# Patient Record
Sex: MALE | Race: AMERICAN INDIAN OR ALASKA NATIVE
[De-identification: names, ages, dates, MRNs, and addresses within clinical notes are randomized per-mention and may not be internally consistent; named-entity substitution may affect disease eponyms.]

---

## 2019-11-21 ENCOUNTER — HOSPITAL ENCOUNTER (EMERGENCY)
Dept: HOSPITAL 5 - ED | Age: 19
Discharge: HOME | End: 2019-11-21
Payer: COMMERCIAL

## 2019-11-21 VITALS — SYSTOLIC BLOOD PRESSURE: 129 MMHG | DIASTOLIC BLOOD PRESSURE: 49 MMHG

## 2019-11-21 DIAGNOSIS — Z79.899: ICD-10-CM

## 2019-11-21 DIAGNOSIS — F17.200: ICD-10-CM

## 2019-11-21 DIAGNOSIS — R07.89: Primary | ICD-10-CM

## 2019-11-21 PROCEDURE — 93010 ELECTROCARDIOGRAM REPORT: CPT

## 2019-11-21 PROCEDURE — 71046 X-RAY EXAM CHEST 2 VIEWS: CPT

## 2019-11-21 PROCEDURE — 93005 ELECTROCARDIOGRAM TRACING: CPT

## 2019-11-21 PROCEDURE — 99283 EMERGENCY DEPT VISIT LOW MDM: CPT

## 2019-11-21 NOTE — XRAY REPORT
CHEST 2 VIEWS 



INDICATION / CLINICAL INFORMATION:

Chest pain beginning this morning.



COMPARISON: 

None available.



FINDINGS:



SUPPORT DEVICES: None.

HEART / MEDIASTINUM: No significant abnormality. 

LUNGS / PLEURA: No significant pulmonary or pleural abnormality. No pneumothorax. 



ADDITIONAL FINDINGS: No significant additional findings.



IMPRESSION:

1. No acute abnormality of the chest.



Signer Name: Panda Alonzo MD 

Signed: 11/21/2019 2:40 PM

 Workstation Name: [id number]

## 2019-11-21 NOTE — EMERGENCY DEPARTMENT REPORT
ED General Adult HPI





- General


Chief complaint: Chest Pain


Stated complaint: SOB/HEART MURMUR


Time Seen by Provider: 19 15:53


Source: patient


Mode of arrival: Ambulatory


Limitations: No Limitations





- History of Present Illness


Initial comments: 





19-year-old -American male to emergency department complaining of central

chest pain that worsens with range of motion and deep breaths which is started 

when he woke up today.  States that he works for [employer] and has been doing a lot of

packing attending and development which preceded his pain episodes.  Reports no 

fever, chills, sweats no hemoptysis no hematemesis or hematochezia coryza no 

sputum production or wheezing.  He has no current treatment for any 

cardiopulmonary pathology.


-: Gradual


Radiation: non-radiation


Quality: stabbing, constant


Consistency: constant


Worsens with: none


Associated Symptoms: chest pain.  denies: cough, diaphoresis, loss of appetite, 

malaise, syncope, weakness


Treatments Prior to Arrival: none





- Related Data


                                  Previous Rx's











 Medication  Instructions  Recorded  Last Taken  Type


 


Ketorolac [Toradol] 10 mg PO Q6H PRN #20 tablet 19 Unknown Rx











                                    Allergies











Allergy/AdvReac Type Severity Reaction Status Date / Time


 


No Known Allergies Allergy   Unverified 19 13:31














ED Review of Systems


ROS: 


Stated complaint: SOB/HEART MURMUR


Other details as noted in HPI





Comment: All other systems reviewed and negative





ED Past Medical Hx





- Past Medical History


Previous Medical History?: Yes


Additional medical history: pulmonary stenosis





- Surgical History


Past Surgical History?: No





- Social History


Smoking Status: Current Every Day Smoker


Substance Use Type: None





- Medications


Home Medications: 


                                Home Medications











 Medication  Instructions  Recorded  Confirmed  Last Taken  Type


 


Ketorolac [Toradol] 10 mg PO Q6H PRN #20 tablet 19  Unknown Rx














ED Physical Exam





- General


Limitations: No Limitations


General appearance: alert, in no apparent distress





- Head


Head exam: Present: atraumatic, normocephalic





- Eye


Eye exam: Present: normal appearance, PERRL, EOMI.  Absent: scleral icterus, 

conjunctival injection, periorbital swelling, periorbital tenderness


Pupils: Present: normal accommodation





- ENT


ENT exam: Present: normal exam, normal orophraynx, mucous membranes moist, TM's 

normal bilaterally





- Neck


Neck exam: Present: normal inspection, full ROM





- Respiratory


Respiratory exam: Present: normal lung sounds bilaterally.  Absent: respiratory 

distress, wheezes, rales, chest wall tenderness, decreased breath sounds, 

prolonged expiratory





- Cardiovascular


Cardiovascular Exam: Present: regular rate, normal rhythm.  Absent: tachycardia,

 irregular rhythm, systolic murmur, diastolic murmur, rubs, gallop





- GI/Abdominal


GI/Abdominal exam: Present: soft, normal bowel sounds.  Absent: tenderness, 

guarding, hyperactive bowel sounds, hypoactive bowel sounds, organomegaly





- Rectal


Rectal exam: Present: deferred





- Extremities Exam


Extremities exam: Present: normal inspection, full ROM, normal capillary refill





- Back Exam


Back exam: Present: normal inspection.  Absent: CVA tenderness (R), CVA 

tenderness (L)





- Neurological Exam


Neurological exam: Present: alert, oriented X3, CN II-XII intact, normal gait.  

Absent: motor sensory deficit, reflexes normal





- Psychiatric


Psychiatric exam: Present: normal affect, normal mood.  Absent: flat affect, 

manic





- Skin


Skin exam: Present: warm, dry, intact, normal color.  Absent: rash





ED Course





                                   Vital Signs











  19





  14:07 15:56


 


Temperature 98.2 F 


 


Pulse Rate 70 80


 


Respiratory 16 18





Rate  


 


Blood Pressure 129/49 





[Left]  


 


O2 Sat by Pulse 96 100





Oximetry  














ED Medical Decision Making





- Radiology Data


Radiology results: report reviewed





[place] [address]





XRay Report


Signed





Patient: GITA HENRY MR#: [id number]


[id number]


: 2000 Acct:[id number]





Age/Sex: 19 / M ADM Date: 19





Loc: ED


Attending Dr:








Ordering Physician: DANIEL VILLARREAL


Date of Service: 19


Procedure(s): XR chest routine 2V


Accession Number(s): [id number]





cc: DANIEL VILLARREAL





Fluoro Time In Minutes:





CHEST 2 VIEWS





INDICATION / CLINICAL INFORMATION:


Chest pain beginning this morning.





COMPARISON:


None available.





FINDINGS:





SUPPORT DEVICES: None.


HEART / MEDIASTINUM: No significant abnormality.


LUNGS / PLEURA: No significant pulmonary or pleural abnormality. No 

pneumothorax.





ADDITIONAL FINDINGS: No significant additional findings.





IMPRESSION:


1. No acute abnormality of the chest.





Signer Name: Panda Alonzo MD


Signed: 2019 2:40 PM


Workstation Name: [id number]








Transcribed By: MN


Dictated By: Panda Alonzo MD


Electronically Authenticated By: Panda Alonzo MD


Signed Date/Time: 19 1440











DD/DT: 19 1439


TD/TT:





- Medical Decision Making





Mr. Henry presents with chest pain that is very unlikely angina or acute 

coronary syndrome. The emergency department evaluation has not identified any 

cause for suspicion that this chest pain has a cardiac etiology.  Patient was 

ambulated around the emergency department and maintained a saturation of 100% 

heart rate 80s without dyspnea or chest pain. Based on their history, EKG (which

 showed no evidence of ischemia or infarction) and imaging, in addition to the 

patient's physical exam, I see no evidence at this time for a malignant etiology

 for the patient's chest pain. There is no acute evidence for pulmonary embolus,

 acute myocardial infarction, pneumothorax, Boerhaeve syndrome, cardiac 

tamponade, thoracic artery dissection, or any other emergent cardiac, pulmonary 

or aortic pathology. Given the low pre-test probability for cardiac etiology of 

chest pain and the absence of any sign of ischemia or infarction, discharge for 

outpatient follow-up and further evaluation is reasonable.





I have explained to the patient that even though a cardiac problem is very 

unlikely, follow-up and further testing is required to reduce further the 

already small uncertainty that exists. Other life-threatening diagnoses have 

been considered. The patient understands the need to return immediately if their

 symptoms worsen or they develop any new symptoms, and not to engage in any 

significant exertional activity until follow-up is obtained.


Critical care attestation.: 


If time is entered above; I have spent that time in minutes in the direct care 

of this critically ill patient, excluding procedure time.








ED Disposition


Clinical Impression: 


 Chest pain





Disposition: DC-01 TO HOME OR SELFCARE


Is pt being admited?: No


Does the pt Need Aspirin: No


Condition: Stable


Instructions:  Chest Pain (ED), Costochondritis (ED)


Prescriptions: 


Ketorolac [Toradol] 10 mg PO Q6H PRN #20 tablet


 PRN Reason: Pain


Referrals: 


[facility] [Provider Group] - 3-5 Days

## 2019-11-21 NOTE — EVENT NOTE
ED Screening Note


Date of service: 11/21/19


Time: 14:07


ED Screening Note: 





19 y o male presents with chest pain mid chest x this morning


PMH: pulmonary stenosis





This initial assessment/diagnostic orders/clinical plan/treatment(s) is/are 

subject to change based on patients health status, clinical progression and re-

assessment by fellow clinical providers in the ED. Further treatment and workup 

at subsequent clinical providers discretion. Patient/guardian urged not to elope

from the ED as their condition may be serious if not clinically assessed and 

managed. 





Initial orders include: 


labs,cx